# Patient Record
Sex: MALE | Race: WHITE | Employment: PART TIME | URBAN - METROPOLITAN AREA
[De-identification: names, ages, dates, MRNs, and addresses within clinical notes are randomized per-mention and may not be internally consistent; named-entity substitution may affect disease eponyms.]

---

## 2020-12-18 ENCOUNTER — HOSPITAL ENCOUNTER (EMERGENCY)
Age: 55
Discharge: HOME | End: 2020-12-18
Attending: EMERGENCY MEDICINE

## 2020-12-18 ENCOUNTER — APPOINTMENT (OUTPATIENT)
Dept: GENERAL RADIOLOGY | Age: 55
End: 2020-12-18

## 2020-12-18 VITALS
HEART RATE: 69 BPM | SYSTOLIC BLOOD PRESSURE: 158 MMHG | BODY MASS INDEX: 26.6 KG/M2 | DIASTOLIC BLOOD PRESSURE: 81 MMHG | TEMPERATURE: 97.9 F | OXYGEN SATURATION: 97 % | WEIGHT: 190 LBS | RESPIRATION RATE: 16 BRPM | HEIGHT: 71 IN

## 2020-12-18 PROCEDURE — 12002 RPR S/N/AX/GEN/TRNK2.6-7.5CM: CPT | Performed by: ORTHOPAEDIC SURGERY

## 2020-12-18 PROCEDURE — 96372 THER/PROPH/DIAG INJ SC/IM: CPT

## 2020-12-18 PROCEDURE — 99283 EMERGENCY DEPT VISIT LOW MDM: CPT | Performed by: ORTHOPAEDIC SURGERY

## 2020-12-18 PROCEDURE — 11010 DEBRIDE SKIN AT FX SITE: CPT | Performed by: ORTHOPAEDIC SURGERY

## 2020-12-18 PROCEDURE — 26725 TREAT FINGER FRACTURE EACH: CPT | Performed by: ORTHOPAEDIC SURGERY

## 2020-12-18 PROCEDURE — 90715 TDAP VACCINE 7 YRS/> IM: CPT | Performed by: STUDENT IN AN ORGANIZED HEALTH CARE EDUCATION/TRAINING PROGRAM

## 2020-12-18 PROCEDURE — 6360000002 HC RX W HCPCS: Performed by: STUDENT IN AN ORGANIZED HEALTH CARE EDUCATION/TRAINING PROGRAM

## 2020-12-18 PROCEDURE — 73130 X-RAY EXAM OF HAND: CPT

## 2020-12-18 PROCEDURE — 96375 TX/PRO/DX INJ NEW DRUG ADDON: CPT

## 2020-12-18 PROCEDURE — 96366 THER/PROPH/DIAG IV INF ADDON: CPT

## 2020-12-18 PROCEDURE — 99284 EMERGENCY DEPT VISIT MOD MDM: CPT

## 2020-12-18 PROCEDURE — 90471 IMMUNIZATION ADMIN: CPT | Performed by: STUDENT IN AN ORGANIZED HEALTH CARE EDUCATION/TRAINING PROGRAM

## 2020-12-18 PROCEDURE — 96365 THER/PROPH/DIAG IV INF INIT: CPT

## 2020-12-18 RX ORDER — MORPHINE SULFATE 4 MG/ML
4 INJECTION, SOLUTION INTRAMUSCULAR; INTRAVENOUS ONCE
Status: COMPLETED | OUTPATIENT
Start: 2020-12-18 | End: 2020-12-18

## 2020-12-18 RX ORDER — CEFAZOLIN SODIUM 2 G/50ML
2 SOLUTION INTRAVENOUS ONCE
Status: COMPLETED | OUTPATIENT
Start: 2020-12-18 | End: 2020-12-18

## 2020-12-18 RX ORDER — HYDROCODONE BITARTRATE AND ACETAMINOPHEN 5; 325 MG/1; MG/1
1 TABLET ORAL EVERY 6 HOURS PRN
Qty: 28 TABLET | Refills: 0 | Status: ON HOLD | OUTPATIENT
Start: 2020-12-18 | End: 2020-12-21 | Stop reason: HOSPADM

## 2020-12-18 RX ORDER — LIDOCAINE HYDROCHLORIDE 10 MG/ML
INJECTION, SOLUTION INFILTRATION; PERINEURAL
Status: DISCONTINUED
Start: 2020-12-18 | End: 2020-12-18 | Stop reason: HOSPADM

## 2020-12-18 RX ORDER — DOXYCYCLINE HYCLATE 100 MG
100 TABLET ORAL 2 TIMES DAILY
Qty: 14 TABLET | Refills: 0 | Status: SHIPPED | OUTPATIENT
Start: 2020-12-18 | End: 2020-12-25

## 2020-12-18 RX ORDER — CEPHALEXIN 500 MG/1
500 CAPSULE ORAL 4 TIMES DAILY
Qty: 28 CAPSULE | Refills: 0 | Status: SHIPPED | OUTPATIENT
Start: 2020-12-18 | End: 2020-12-25

## 2020-12-18 RX ADMIN — MORPHINE SULFATE 4 MG: 4 INJECTION, SOLUTION INTRAMUSCULAR; INTRAVENOUS at 16:11

## 2020-12-18 RX ADMIN — CEFAZOLIN SODIUM 2 G: 2 SOLUTION INTRAVENOUS at 17:07

## 2020-12-18 RX ADMIN — TETANUS TOXOID, REDUCED DIPHTHERIA TOXOID AND ACELLULAR PERTUSSIS VACCINE, ADSORBED 0.5 ML: 5; 2.5; 8; 8; 2.5 SUSPENSION INTRAMUSCULAR at 16:12

## 2020-12-18 ASSESSMENT — PAIN DESCRIPTION - ORIENTATION: ORIENTATION: RIGHT

## 2020-12-18 ASSESSMENT — ENCOUNTER SYMPTOMS
VOMITING: 0
RHINORRHEA: 0
ABDOMINAL PAIN: 0
NAUSEA: 0
SHORTNESS OF BREATH: 0
BACK PAIN: 0

## 2020-12-18 ASSESSMENT — PAIN SCALES - GENERAL
PAINLEVEL_OUTOF10: 4
PAINLEVEL_OUTOF10: 5

## 2020-12-18 ASSESSMENT — PAIN DESCRIPTION - LOCATION: LOCATION: HAND

## 2020-12-18 ASSESSMENT — PAIN DESCRIPTION - PAIN TYPE: TYPE: ACUTE PAIN

## 2020-12-18 NOTE — ED PROVIDER NOTES
Patient is a 60-year-old male with no significant past medical history presents to the emergency department with a right hand injury. Patient states approximately 2 hours prior to arrival he was using his push snowblower when he noticed an occlusion in the shoot. He attempted to clear the snow chute while the snowblower was turned on using his fist, patient states he was also wearing leather gloves. Patient sustained injury to middle finger and came to the emergency department. Patient tetanus is not up-to-date. Patient is not on anticoagulation. Patient without previous injury to that hand or finger. Patient denies paresthesias or numbness, does complain of pain. Pain is 8/10, nonradiating, no palliation, no provocation, constant. Review of Systems   Constitutional: Negative for chills and fever. HENT: Negative for congestion and rhinorrhea. Respiratory: Negative for shortness of breath. Cardiovascular: Negative for chest pain. Gastrointestinal: Negative for abdominal pain, nausea and vomiting. Genitourinary: Negative for flank pain, frequency and urgency. Musculoskeletal: Positive for arthralgias. Negative for back pain, myalgias and neck pain. Skin: Positive for wound. Negative for rash. Neurological: Negative for dizziness, syncope, weakness and headaches. Psychiatric/Behavioral: Negative for agitation. The patient is not nervous/anxious. Physical Exam  Vitals signs and nursing note reviewed. Constitutional:       General: He is in acute distress. Appearance: He is not ill-appearing or toxic-appearing. HENT:      Head: Normocephalic and atraumatic. Mouth/Throat:      Mouth: Mucous membranes are moist.      Pharynx: Oropharynx is clear. Eyes:      Extraocular Movements: Extraocular movements intact. Pupils: Pupils are equal, round, and reactive to light. Neck:      Musculoskeletal: Normal range of motion and neck supple.    Cardiovascular: Rate and Rhythm: Normal rate and regular rhythm. Pulses: Normal pulses. Heart sounds: Normal heart sounds. Pulmonary:      Effort: Pulmonary effort is normal.      Breath sounds: Normal breath sounds. Abdominal:      General: Bowel sounds are normal. There is no distension. Palpations: Abdomen is soft. Tenderness: There is no abdominal tenderness. Musculoskeletal:      Right hand: He exhibits decreased range of motion, tenderness, deformity and laceration. Normal sensation noted. Decreased strength noted. Skin:     General: Skin is warm and dry. Capillary Refill: Capillary refill takes less than 2 seconds. Neurological:      Mental Status: He is alert and oriented to person, place, and time. GCS: GCS eye subscore is 4. GCS verbal subscore is 5. GCS motor subscore is 6. Cranial Nerves: Cranial nerves are intact. Sensory: Sensation is intact. Motor: Motor function is intact. MDM  Number of Diagnoses or Management Options  Closed displaced fracture of middle phalanx of right middle finger, initial encounter  Mutilating hand injury, right, initial encounter  Diagnosis management comments: Patient is a 27-year-old male who presents to the emergency department for traumatic injury to the right hand after putting his hand in a running snowblower. Patient with deformity, lacerations to the right middle finger, right index finger. Patient presents awake, alert, following all commands, complaining of pain to the right hand. Vital signs show no abnormalities including no hypotension or tachycardia. Physical exam shows lacerations to the middle and index finger, with deformity. She was treated with analgesia. Physical exam shows deformity, pulse, motor, sensation are intact distal.  Bleeding is controlled. Strength and range of motion are decreased due to swelling and pain.   X-rays show fractures to the second and third phalanx of the middle finger as well as avulsion fracture. Orthopedic surgery was consulted and resident came to the emergency department. Treatment by resident including evaluation, repair, splinting. Patient was able to be discharged per orthopedic surgery to follow-up with their offices on Monday. Treatment and antibiotics as per orthopedic surgery. Patient was monitored in the emergency department that further change and he was able to be discharged in stable condition. Patient was given analgesia and antibiotics on discharge. Amount and/or Complexity of Data Reviewed  Clinical lab tests: ordered and reviewed  Tests in the radiology section of CPT®: ordered and reviewed                                     ED Course as of Dec 18 2130   Fri Dec 18, 2020   1530 ATTENDING PROVIDER ATTESTATION:     Sofia Corona presented to the emergency department for evaluation of [unfilled]  I have reviewed and discussed the case, including pertinent history (medical, surgical, family and social) and exam findings with the Midlevel and the Nurse assigned to Sofia Corona. I have personally performed and/or participated in the history, exam, medical decision making, and procedures and agree with all pertinent clinical information. I have reviewed my findings and recommendations with Sofia Corona and members of family present at the time of disposition. My findings/plan: Patient is a 64-year-old male who presents the chief complaint of right hand lacerations. Patient states that about 2 hours ago he had a leather glove on and made a fist and was punching snow into a snowblower that was on. He did sustain multiple lacerations of his right index and middle finger. Patient denies any numbness or tingling of the extremity. His tetanus is not up-to-date. Patient has been putting pressure on the wounds until he can find a ride to the emergency department. No blood clotting disorders and he is on no blood thinners.   He does admit 2 hours ago he had a leather glove on and made a fist and was punching snow into a snowblower that was on. He did sustain multiple lacerations of his right index and middle finger. Patient denies any numbness or tingling of the extremity. His tetanus is not up-to-date. Patient has been putting pressure on the wounds until he can find a ride to the emergency department. No blood clotting disorders and he is on no blood thinners. He does admit to pain of the fingers. On examination he is resting comfortably in bed. Clear breath sounds in all lung fields. Regular rate and rhythm of the heart. No abdominal tenderness palpation. No lower extremity edema. He is alert and oriented x3. Patient does have multiple lacerations of his right index and middle finger. Decreased range of motion secondary to pain. Bleeding is controlled. The lacerations are deep and concern for possible open fracture. Pictures are included in the note. Marcela Mayer DO      [MS]   9261 Consult Dr. Becka Servin and patient was discussed. Recommends Betadine soak of the right hand, and resident will come see the patient. Plan for possible admission.    [QC]   1708 Patient reevaluation. Patient has improved pain to hand with analgesia. [QC]   18 Contacted the orthopedic surgery resident, Dr. Vivian Hill. He is currently at [facility] doing another procedure and will be at our facility shortly. [MS]   Liyah resident is in the ED to examine the patient at this time. [MS]      ED Course User Index  [MS] Soraya Warren DO  [QC] Paula Leary MD       --------------------------------------------- PAST HISTORY ---------------------------------------------  Past Medical History:  has no past medical history on file. Past Surgical History:  has a past surgical history that includes Tonsillectomy. Social History:  reports that he has been smoking cigars.  He uses smokeless tobacco. He reports current alcohol use. He reports current drug use. Drug: Marijuana. Family History: family history is not on file. The patients home medications have been reviewed. Allergies: Patient has no known allergies. -------------------------------------------------- RESULTS -------------------------------------------------    Lab  No results found for this visit on 12/18/20. Radiology  XR HAND RIGHT (MIN 3 VIEWS)   Final Result   Soft tissue lacerations involving the 2nd and 3rd digits distally, along with   fractures at the distal aspect of the 3rd proximal phalanx and proximal   aspect of the 3rd middle phalanx. There also appears to be a small avulsion   fracture at the base of the 2nd distal phalanx. Several punctate   hyperdensities in the distal soft tissues of the 3rd digit are suggestive of   tiny radiopaque foreign bodies. XR HAND RIGHT (MIN 3 VIEWS)    (Results Pending)     ------------------------- NURSING NOTES AND VITALS REVIEWED ---------------------------  Date / Time Roomed:  12/18/2020  3:30 PM  ED Bed Assignment:  01/01    The nursing notes within the ED encounter and vital signs as below have been reviewed. Patient Vitals for the past 24 hrs:   BP Temp Temp src Pulse Resp SpO2 Height Weight   12/18/20 1841 (!) 158/81 -- -- 69 16 97 % -- --   12/18/20 1530 (!) 157/97 -- -- -- -- -- -- --   12/18/20 1524 -- 97.9 °F (36.6 °C) Temporal 85 18 97 % 5' 10.5\" (1.791 m) 190 lb (86.2 kg)     Oxygen Saturation Interpretation: Normal    ------------------------------------------ PROGRESS NOTES ------------------------------------------  Re-evaluation(s):   Patients symptoms are improving  Repeat physical examination is not changed    Patients symptoms are improving  Repeat physical examination is not changed    I have spoken with the patient and discussed todays results, in addition to providing specific details for the plan of care and counseling regarding the diagnosis and prognosis. Their questions are answered at this time and they are agreeable with the plan.      --------------------------------- ADDITIONAL PROVIDER NOTES ---------------------------------  Consultations:  Spoke with Dr. Ernesto Mujica,  They will provide consultation, will come to the ED to evaluate this patient, state that the patient is okay to discharge home and will see this patient in follow-up. This patient's ED course included: a personal history and physicial examination, re-evaluation prior to disposition and IV medications    This patient has remained hemodynamically stable and remained unchanged during their ED course. Please note that the withdrawal or failure to initiate urgent interventions for this patient would likely result in a life threatening deterioration or permanent disability. Clinical Impression  1. Closed displaced fracture of middle phalanx of right middle finger, initial encounter    2. Mutilating hand injury, right, initial encounter          Disposition  Patient's disposition: Discharge to home  Patient's condition is stable    12/18/20, 9:30 PM EST.     This note is prepared by Bety Albright MD -PGY-1         Bety Albright MD  Resident  12/19/20 1351

## 2020-12-18 NOTE — ED NOTES
Vitals reassessed, pt states not really having any pain, awaiting ortho     Renee Nice, HAY  12/18/20 9714

## 2020-12-19 NOTE — CONSULTS
Department of Orthopedic Surgery  Resident Consult Note      Reason for Consult: Right hand pain    HISTORY OF PRESENT ILLNESS:       Patient is a 48 y.o. male, right-hand-dominant, who presents with complaint of right hand pain. Earlier today he was using a snowblower and attempted to remove impacted snow from the machine with a closed fist in a glove. The auger in the snowblower became unclogged and injured his hand. He noted immediate pain to the hand as well as some bleeding. He presented to the emergency department where his injuries were evaluated by the emergency physician. He received 2 g of Ancef as well as tetanus. Orthopedic surgery was consulted for further evaluation and management of his injury. Patient's pain is fairly well controlled at time of examination. He admits to some numbness in the digits. His right second and third digit of the affected areas. He has had several distant injuries which required orthopedic intervention, however he is not currently established with any orthopedic provider. He has no further orthopedic complaints at this time. Past Medical History:    No past medical history on file. Past Surgical History:        Procedure Laterality Date    TONSILLECTOMY       Current Medications:   Current Facility-Administered Medications: lidocaine 1 % injection, , ,   Allergies:  Patient has no known allergies. Social History:   TOBACCO:   reports that he has been smoking cigars. He uses smokeless tobacco.  ETOH:   reports current alcohol use. DRUGS:   reports current drug use. Drug: Marijuana. ACTIVITIES OF DAILY LIVING:    OCCUPATION:    Family History:   No family history on file.     REVIEW OF SYSTEMS:  CONSTITUTIONAL:  negative for  fevers, chills  EYES:  negative for blurred vision, visual disturbance  HEENT:  negative for  hearing loss, voice change  RESPIRATORY:  negative for  dyspnea, wheezing  CARDIOVASCULAR:  negative for  chest pain, palpitations  GASTROINTESTINAL:  negative for nausea, vomiting  GENITOURINARY:  negative for frequency, urinary incontinence  HEMATOLOGIC/LYMPHATIC:  negative for bleeding and petechiae  MUSCULOSKELETAL:  See HPI  NEUROLOGICAL:  negative for headaches, dizziness  BEHAVIOR/PSYCH:  negative for increased agitation and anxiety    PHYSICAL EXAM:    VITALS:  BP (!) 158/81   Pulse 69   Temp 97.9 °F (36.6 °C) (Temporal)   Resp 16   Ht 5' 10.5\" (1.791 m)   Wt 190 lb (86.2 kg)   SpO2 97%   BMI 26.88 kg/m²   CONSTITUTIONAL:  awake, alert, cooperative, no apparent distress, and appears stated age  MUSCULOSKELETAL:  Right upper Extremity:  · Several areas of laceration noted to the right long and index finger. Index finger laceration over the dorsal surface just proximal to the nail plate and extends to the ulnar side of the digit. Lacerations noted over the dorsal aspect of the long finger. There are also lacerations noted over the volar side of the long finger at the level of the DIPJ. · There is also deformity noted about the middle phalanx of the long finger, the distal finger is angulated ulnar  · Sensation is intact distally in all fingertips of both the radial and ulnar side with the exception of the ulnar side of the long finger  · Motor function is intact in the AIN, PIN, radial, ulnar, and median nerve distributions. Specifically the patient is able to extend and flex at the PIP J and DIPJ of the index and long finger  · Patient is unable to make a composite fist secondary to swelling and pain  · Radial pulse +2/4  · No tenderness to palpation about the wrist, elbow, or shoulder  · Compartments soft and compressible    Secondary Exam:   · leftUE: No obvious signs of trauma. -TTP to fingers, hand, wrist, forearm, elbow, humerus, shoulder or clavicle. -- Patient able to flex/extend fingers, wrist, elbow and shoulder with active and passive ROM without pain, +2/4 Radial pulse, cap refill <3sec, +AIN/PIN/Radial/Ulnar/Median N, distal sensation grossly intact to C4-T1 dermatomes, compartments soft and compressible. · bilateralLE: No obvious signs of trauma. -TTP to foot, ankle, leg, knee, thigh, hip.-- Patient able to flex/extend toes, ankle, knee and hip with active and passive ROM without pain,+2/4 DP & PT pulses, cap refill <3sec, +5/5 PF/DF/EHL, distal sensation grossly intact to L4-S1 dermatomes, compartments soft and compressible. · Pelvis: -TTP, -Log roll, -Heel strike     DATA:    CBC: No results found for: WBC, RBC, HGB, HCT, MCV, MCH, MCHC, RDW, PLT, MPV  PT/INR:  No results found for: PROTIME, INR    Radiology Review:  X-ray imaging obtained of the right hand. An oblique fracture is noted over the proximal third to middle third of the middle phalanx of the long finger. The distal fragment is translated volarly and there is shortening. There is also a bone fragment which appears to be avulsed from the distal aspect of the proximal phalanx of the long finger. There is also a small bony fragment off the dorsal aspect of the distal part of the middle phalanx of the index finger. IMPRESSION:  · Closed fracture of the right long finger, middle phalanx    PLAN:  · Consent was obtained from the patient. The dorsal aspect of the hand was prepared with chlorhexidine. 20 cc of lidocaine without epinephrine was infiltrated about the radial and ulnar digital nerves to the long and index finger of the right hand. After appropriate anesthetic response was obtained, the hand was sterilely prepared with Betadine and was draped with sterile technique. The areas of laceration were copiously irrigated and explored. The laceration of the dorsal aspect of the index finger was first explored. The proximal nail plate on that digit was displaced dorsally over the eponychium. This was repositioned to the anatomic location. The nail plate was found to be well fixed both ulnarly and radially.   The

## 2020-12-21 ENCOUNTER — HOSPITAL ENCOUNTER (OUTPATIENT)
Age: 55
Setting detail: OUTPATIENT SURGERY
Discharge: HOME | End: 2020-12-21
Attending: ORTHOPAEDIC SURGERY | Admitting: ORTHOPAEDIC SURGERY

## 2020-12-21 ENCOUNTER — ANESTHESIA EVENT (OUTPATIENT)
Dept: OPERATING ROOM | Age: 55
End: 2020-12-21

## 2020-12-21 ENCOUNTER — ANESTHESIA (OUTPATIENT)
Dept: OPERATING ROOM | Age: 55
End: 2020-12-21

## 2020-12-21 ENCOUNTER — HOSPITAL ENCOUNTER (OUTPATIENT)
Dept: GENERAL RADIOLOGY | Age: 55
Discharge: HOME | End: 2020-12-23
Attending: ORTHOPAEDIC SURGERY

## 2020-12-21 VITALS
SYSTOLIC BLOOD PRESSURE: 140 MMHG | BODY MASS INDEX: 27.86 KG/M2 | HEART RATE: 65 BPM | TEMPERATURE: 97.1 F | DIASTOLIC BLOOD PRESSURE: 77 MMHG | OXYGEN SATURATION: 97 % | WEIGHT: 199 LBS | HEIGHT: 71 IN | RESPIRATION RATE: 16 BRPM

## 2020-12-21 VITALS
RESPIRATION RATE: 13 BRPM | OXYGEN SATURATION: 100 % | SYSTOLIC BLOOD PRESSURE: 132 MMHG | DIASTOLIC BLOOD PRESSURE: 90 MMHG

## 2020-12-21 LAB
AMPHETAMINE SCREEN, URINE: NOT DETECTED
BARBITURATE SCREEN URINE: NOT DETECTED
BENZODIAZEPINE SCREEN, URINE: NOT DETECTED
CANNABINOID SCREEN URINE: POSITIVE
COCAINE METABOLITE SCREEN URINE: NOT DETECTED
FENTANYL SCREEN, URINE: NOT DETECTED
Lab: ABNORMAL
METHADONE SCREEN, URINE: NOT DETECTED
OPIATE SCREEN URINE: NOT DETECTED
OXYCODONE URINE: NOT DETECTED
PHENCYCLIDINE SCREEN URINE: NOT DETECTED
SARS-COV-2, NAAT: NOT DETECTED

## 2020-12-21 PROCEDURE — U0002 COVID-19 LAB TEST NON-CDC: HCPCS

## 2020-12-21 PROCEDURE — 7100000010 HC PHASE II RECOVERY - FIRST 15 MIN: Performed by: ORTHOPAEDIC SURGERY

## 2020-12-21 PROCEDURE — 3209999900 FLUORO FOR SURGICAL PROCEDURES

## 2020-12-21 PROCEDURE — 26356 REPAIR FINGER/HAND TENDON: CPT | Performed by: ORTHOPAEDIC SURGERY

## 2020-12-21 PROCEDURE — 6360000002 HC RX W HCPCS: Performed by: ORTHOPAEDIC SURGERY

## 2020-12-21 PROCEDURE — 80307 DRUG TEST PRSMV CHEM ANLYZR: CPT

## 2020-12-21 PROCEDURE — 3700000001 HC ADD 15 MINUTES (ANESTHESIA): Performed by: ORTHOPAEDIC SURGERY

## 2020-12-21 PROCEDURE — 2580000003 HC RX 258: Performed by: NURSE ANESTHETIST, CERTIFIED REGISTERED

## 2020-12-21 PROCEDURE — 26735 TREAT FINGER FRACTURE EACH: CPT | Performed by: ORTHOPAEDIC SURGERY

## 2020-12-21 PROCEDURE — 2709999900 HC NON-CHARGEABLE SUPPLY: Performed by: ORTHOPAEDIC SURGERY

## 2020-12-21 PROCEDURE — 3700000000 HC ANESTHESIA ATTENDED CARE: Performed by: ORTHOPAEDIC SURGERY

## 2020-12-21 PROCEDURE — 64415 NJX AA&/STRD BRCH PLXS IMG: CPT | Performed by: ANESTHESIOLOGY

## 2020-12-21 PROCEDURE — 6360000002 HC RX W HCPCS

## 2020-12-21 PROCEDURE — 2580000003 HC RX 258: Performed by: ANESTHESIOLOGY

## 2020-12-21 PROCEDURE — 3600000014 HC SURGERY LEVEL 4 ADDTL 15MIN: Performed by: ORTHOPAEDIC SURGERY

## 2020-12-21 PROCEDURE — 7100000000 HC PACU RECOVERY - FIRST 15 MIN: Performed by: ORTHOPAEDIC SURGERY

## 2020-12-21 PROCEDURE — 3600000004 HC SURGERY LEVEL 4 BASE: Performed by: ORTHOPAEDIC SURGERY

## 2020-12-21 PROCEDURE — C1713 ANCHOR/SCREW BN/BN,TIS/BN: HCPCS | Performed by: ORTHOPAEDIC SURGERY

## 2020-12-21 PROCEDURE — 7100000011 HC PHASE II RECOVERY - ADDTL 15 MIN: Performed by: ORTHOPAEDIC SURGERY

## 2020-12-21 PROCEDURE — 7100000001 HC PACU RECOVERY - ADDTL 15 MIN: Performed by: ORTHOPAEDIC SURGERY

## 2020-12-21 DEVICE — SCREW BNE L9MM DIA1.5MM CORT S STL ST FULL THRD T4 STARDRV: Type: IMPLANTABLE DEVICE | Site: MIDDLE FINGER | Status: FUNCTIONAL

## 2020-12-21 DEVICE — SCREW BNE L7MM DIA1.5MM CORT S STL ST FULL THRD T4 STARDRV: Type: IMPLANTABLE DEVICE | Site: MIDDLE FINGER | Status: FUNCTIONAL

## 2020-12-21 DEVICE — SCREW BNE L8MM DIA1.5MM CORT S STL ST FULL THRD T4 STARDRV: Type: IMPLANTABLE DEVICE | Site: MIDDLE FINGER | Status: FUNCTIONAL

## 2020-12-21 DEVICE — PLATE BNE L40MM THK1MM 3X7 H HND 316L S STL T SHP VAR ANG: Type: IMPLANTABLE DEVICE | Site: MIDDLE FINGER | Status: FUNCTIONAL

## 2020-12-21 RX ORDER — ROPIVACAINE HYDROCHLORIDE 5 MG/ML
INJECTION, SOLUTION EPIDURAL; INFILTRATION; PERINEURAL
Status: COMPLETED
Start: 2020-12-21 | End: 2020-12-21

## 2020-12-21 RX ORDER — CEFAZOLIN SODIUM 2 G/50ML
2 SOLUTION INTRAVENOUS ONCE
Status: COMPLETED | OUTPATIENT
Start: 2020-12-21 | End: 2020-12-21

## 2020-12-21 RX ORDER — CEFAZOLIN SODIUM 2 G/50ML
SOLUTION INTRAVENOUS
Status: DISCONTINUED
Start: 2020-12-21 | End: 2020-12-21 | Stop reason: HOSPADM

## 2020-12-21 RX ORDER — SODIUM CHLORIDE 0.9 % (FLUSH) 0.9 %
10 SYRINGE (ML) INJECTION PRN
Status: DISCONTINUED | OUTPATIENT
Start: 2020-12-21 | End: 2020-12-21 | Stop reason: HOSPADM

## 2020-12-21 RX ORDER — MIDAZOLAM HYDROCHLORIDE 1 MG/ML
INJECTION INTRAMUSCULAR; INTRAVENOUS
Status: COMPLETED
Start: 2020-12-21 | End: 2020-12-21

## 2020-12-21 RX ORDER — ROPIVACAINE HYDROCHLORIDE 5 MG/ML
40 INJECTION, SOLUTION EPIDURAL; INFILTRATION; PERINEURAL
Status: COMPLETED | OUTPATIENT
Start: 2020-12-21 | End: 2020-12-21

## 2020-12-21 RX ORDER — MIDAZOLAM HYDROCHLORIDE 1 MG/ML
2 INJECTION INTRAMUSCULAR; INTRAVENOUS PRN
Status: COMPLETED | OUTPATIENT
Start: 2020-12-21 | End: 2020-12-21

## 2020-12-21 RX ORDER — FENTANYL CITRATE 50 UG/ML
100 INJECTION, SOLUTION INTRAMUSCULAR; INTRAVENOUS ONCE
Status: COMPLETED | OUTPATIENT
Start: 2020-12-21 | End: 2020-12-21

## 2020-12-21 RX ORDER — DOCUSATE SODIUM 100 MG/1
100 CAPSULE, LIQUID FILLED ORAL 2 TIMES DAILY PRN
Qty: 20 CAPSULE | Refills: 1 | Status: SHIPPED | OUTPATIENT
Start: 2020-12-21 | End: 2021-02-03 | Stop reason: ALTCHOICE

## 2020-12-21 RX ORDER — SODIUM CHLORIDE, SODIUM LACTATE, POTASSIUM CHLORIDE, CALCIUM CHLORIDE 600; 310; 30; 20 MG/100ML; MG/100ML; MG/100ML; MG/100ML
INJECTION, SOLUTION INTRAVENOUS CONTINUOUS PRN
Status: DISCONTINUED | OUTPATIENT
Start: 2020-12-21 | End: 2020-12-21 | Stop reason: SDUPTHER

## 2020-12-21 RX ORDER — OXYCODONE HYDROCHLORIDE AND ACETAMINOPHEN 5; 325 MG/1; MG/1
1 TABLET ORAL EVERY 6 HOURS PRN
Qty: 28 TABLET | Refills: 0 | Status: SHIPPED | OUTPATIENT
Start: 2020-12-21 | End: 2020-12-28

## 2020-12-21 RX ORDER — FENTANYL CITRATE 50 UG/ML
INJECTION, SOLUTION INTRAMUSCULAR; INTRAVENOUS
Status: COMPLETED
Start: 2020-12-21 | End: 2020-12-21

## 2020-12-21 RX ORDER — SODIUM CHLORIDE 0.9 % (FLUSH) 0.9 %
10 SYRINGE (ML) INJECTION EVERY 12 HOURS SCHEDULED
Status: DISCONTINUED | OUTPATIENT
Start: 2020-12-21 | End: 2020-12-21 | Stop reason: HOSPADM

## 2020-12-21 RX ORDER — MEPERIDINE HYDROCHLORIDE 25 MG/ML
12.5 INJECTION INTRAMUSCULAR; INTRAVENOUS; SUBCUTANEOUS EVERY 5 MIN PRN
Status: DISCONTINUED | OUTPATIENT
Start: 2020-12-21 | End: 2020-12-21 | Stop reason: HOSPADM

## 2020-12-21 RX ORDER — FENTANYL CITRATE 50 UG/ML
50 INJECTION, SOLUTION INTRAMUSCULAR; INTRAVENOUS EVERY 10 MIN PRN
Status: DISCONTINUED | OUTPATIENT
Start: 2020-12-21 | End: 2020-12-21 | Stop reason: HOSPADM

## 2020-12-21 RX ORDER — ONDANSETRON 4 MG/1
4 TABLET, FILM COATED ORAL EVERY 6 HOURS PRN
Qty: 20 TABLET | Refills: 1 | Status: SHIPPED | OUTPATIENT
Start: 2020-12-21 | End: 2021-02-03

## 2020-12-21 RX ORDER — SODIUM CHLORIDE, SODIUM LACTATE, POTASSIUM CHLORIDE, CALCIUM CHLORIDE 600; 310; 30; 20 MG/100ML; MG/100ML; MG/100ML; MG/100ML
INJECTION, SOLUTION INTRAVENOUS CONTINUOUS
Status: DISCONTINUED | OUTPATIENT
Start: 2020-12-21 | End: 2020-12-21 | Stop reason: HOSPADM

## 2020-12-21 RX ADMIN — FENTANYL CITRATE 100 MCG: 50 INJECTION, SOLUTION INTRAMUSCULAR; INTRAVENOUS at 10:32

## 2020-12-21 RX ADMIN — MIDAZOLAM 2 MG: 1 INJECTION INTRAMUSCULAR; INTRAVENOUS at 10:37

## 2020-12-21 RX ADMIN — MIDAZOLAM HYDROCHLORIDE 2 MG: 1 INJECTION INTRAMUSCULAR; INTRAVENOUS at 10:32

## 2020-12-21 RX ADMIN — MIDAZOLAM HYDROCHLORIDE 2 MG: 1 INJECTION INTRAMUSCULAR; INTRAVENOUS at 10:37

## 2020-12-21 RX ADMIN — ROPIVACAINE HYDROCHLORIDE 40 ML: 5 INJECTION, SOLUTION EPIDURAL; INFILTRATION; PERINEURAL at 10:40

## 2020-12-21 RX ADMIN — SODIUM CHLORIDE, POTASSIUM CHLORIDE, SODIUM LACTATE AND CALCIUM CHLORIDE: 600; 310; 30; 20 INJECTION, SOLUTION INTRAVENOUS at 09:14

## 2020-12-21 RX ADMIN — CEFAZOLIN SODIUM 2 G: 2 SOLUTION INTRAVENOUS at 11:40

## 2020-12-21 RX ADMIN — MIDAZOLAM 2 MG: 1 INJECTION INTRAMUSCULAR; INTRAVENOUS at 10:32

## 2020-12-21 RX ADMIN — SODIUM CHLORIDE, POTASSIUM CHLORIDE, SODIUM LACTATE AND CALCIUM CHLORIDE: 600; 310; 30; 20 INJECTION, SOLUTION INTRAVENOUS at 11:25

## 2020-12-21 RX ADMIN — FENTANYL CITRATE 100 MCG: 50 INJECTION INTRAMUSCULAR; INTRAVENOUS at 10:32

## 2020-12-21 RX ADMIN — SODIUM CHLORIDE, POTASSIUM CHLORIDE, SODIUM LACTATE AND CALCIUM CHLORIDE: 600; 310; 30; 20 INJECTION, SOLUTION INTRAVENOUS at 12:43

## 2020-12-21 ASSESSMENT — PULMONARY FUNCTION TESTS
PIF_VALUE: 0
PIF_VALUE: 1
PIF_VALUE: 0
PIF_VALUE: 2
PIF_VALUE: 0
PIF_VALUE: 1
PIF_VALUE: 2
PIF_VALUE: 1
PIF_VALUE: 0
PIF_VALUE: 1
PIF_VALUE: 0
PIF_VALUE: 1
PIF_VALUE: 0
PIF_VALUE: 0
PIF_VALUE: 1
PIF_VALUE: 0
PIF_VALUE: 3
PIF_VALUE: 2
PIF_VALUE: 0
PIF_VALUE: 1
PIF_VALUE: 1
PIF_VALUE: 0
PIF_VALUE: 2
PIF_VALUE: 1
PIF_VALUE: 0
PIF_VALUE: 1
PIF_VALUE: 0
PIF_VALUE: 2
PIF_VALUE: 0
PIF_VALUE: 1
PIF_VALUE: 3
PIF_VALUE: 0
PIF_VALUE: 1
PIF_VALUE: 0
PIF_VALUE: 1
PIF_VALUE: 0
PIF_VALUE: 1
PIF_VALUE: 0
PIF_VALUE: 1
PIF_VALUE: 2
PIF_VALUE: 1
PIF_VALUE: 3
PIF_VALUE: 0
PIF_VALUE: 1
PIF_VALUE: 0
PIF_VALUE: 1
PIF_VALUE: 1
PIF_VALUE: 2
PIF_VALUE: 0
PIF_VALUE: 3
PIF_VALUE: 0
PIF_VALUE: 1
PIF_VALUE: 0
PIF_VALUE: 1
PIF_VALUE: 0
PIF_VALUE: 1
PIF_VALUE: 0
PIF_VALUE: 1
PIF_VALUE: 0
PIF_VALUE: 1
PIF_VALUE: 0
PIF_VALUE: 1

## 2020-12-21 ASSESSMENT — PAIN SCALES - GENERAL
PAINLEVEL_OUTOF10: 0

## 2020-12-21 ASSESSMENT — PAIN - FUNCTIONAL ASSESSMENT: PAIN_FUNCTIONAL_ASSESSMENT: 0-10

## 2020-12-21 NOTE — ANESTHESIA POSTPROCEDURE EVALUATION
Department of Anesthesiology  Postprocedure Note    Patient: Michael Mai  MRN: [id number]  YOB: 1965  Date of evaluation: 12/21/2020  Time:  1:50 PM     Procedure Summary     Date: 12/21/20 Room / Location: [address] [place] [address]    Anesthesia Start: 8789 Anesthesia Stop: 7262    Procedure: RIGHT MIDDLE FINGER OPEN REDUCTION INTERNAL FIXATION (Right Fingers) Diagnosis: (FRACTURE)    Surgeons: Walker Escobedo DO Responsible Provider: Cozetta Schilder, MD    Anesthesia Type: general ASA Status: 2          Anesthesia Type: general    Sam Phase I: Sam Score: 10    Sam Phase II:      Last vitals: Reviewed and per EMR flowsheets.        Anesthesia Post Evaluation    Patient location during evaluation: PACU  Patient participation: complete - patient participated  Level of consciousness: awake  Pain score: 0  Airway patency: patent  Nausea & Vomiting: no nausea  Complications: no  Cardiovascular status: blood pressure returned to baseline  Respiratory status: acceptable  Hydration status: euvolemic

## 2020-12-21 NOTE — H&P
Department of Orthopedic Surgery  H&P Note          CHIEF COMPLAINT:  Right Hand Injury    HISTORY OF PRESENT ILLNESS:                The patient is a 48 y.o. male who injured his right hand while cleaning out a snowblower on 12/18/2020. Patient states that the snowblower was clogged with snow. He thought the machine was off when he used his fist to clear the blockage. Unfortunately the snowblower was on and patient suffered multiple lacerations, soft tissue injury as well as bony injuries to his right hand. Patient was initially evaluated emergency department where he was treated with bedside irrigation debridement and primary closure. Patient did have some weakness with full flexion of his right long finger as well suffered bony injuries to his long finger. Patient was found to have a transverse fracture of the proximal phalanx of the right long finger as well as a condyle fracture of the proximal phalanx of the right long finger and possible tendon injury. Past Medical History:    History reviewed. No pertinent past medical history. Past Surgical History:        Procedure Laterality Date    TONSILLECTOMY       Current Medications:   No current facility-administered medications for this encounter. Allergies:  Patient has no known allergies. Social History:   TOBACCO:   reports that he has been smoking cigars. He has smoked for the past 1.00 year. He uses smokeless tobacco.  ETOH:   reports current alcohol use. DRUGS:   reports current drug use. Drug: Marijuana. ACTIVITIES OF DAILY LIVING:    OCCUPATION:    Family History:   History reviewed. No pertinent family history.     REVIEW OF SYSTEMS:  CONSTITUTIONAL:  negative  GASTROINTESTINAL:  negative  GENITOURINARY:  negative  MUSCULOSKELETAL:  positive for  decreased range of motion  NEUROLOGICAL:  negative  BEHAVIOR/PSYCH:  negative    PHYSICAL EXAM:    VITALS:  BP (!) 140/77   Pulse 65   Temp 97.1 °F (36.2 °C) (Temporal)   Resp 16   Ht 5' 10.5\" (1.791 m)   Wt 199 lb (90.3 kg)   SpO2 97%   BMI 28.15 kg/m²   CONSTITUTIONAL:  awake, alert, cooperative, no apparent distress, and appears stated age  EYES:  Lids and lashes normal, pupils equal, round and reactive to light, extra ocular muscles intact, sclera clear, conjunctiva normal  ENT:  Normocephalic, without obvious abnormality, atraumatic, sinuses nontender on palpation, external ears without lesions, oral pharynx with moist mucus membranes, tonsils without erythema or exudates, gums normal and good dentition. NECK:  Supple, symmetrical, trachea midline, no adenopathy, thyroid symmetric, not enlarged and no tenderness, skin normal  LUNGS:  No increased work of breathing, good air exchange, clear to auscultation bilaterally, no crackles or wheezing  CARDIOVASCULAR:  Normal apical impulse, regular rate and rhythm, normal S1 and S2, no S3 or S4, and no murmur noted  ABDOMEN:  No scars, normal bowel sounds, soft, non-distended, non-tender, no masses palpated, no hepatosplenomegally  GENITAL/URINARY:  deferred  NEUROLOGIC:  Awake, alert, oriented to name, place and time. Cranial nerves II-XII are grossly intact. Motor is 5 out of 5 bilaterally. Cerebellar finger to nose, heel to shin intact. Sensory is intact. Babinski down going, Romberg negative, and gait is normal.  MUSCULOSKELETAL:  · Right hand multiple lacerations noted to fingers with approximation with nylon suture. Patient has pain at fracture sites of the right hand as well as deformity noted. Sensation is intact to light touch. Flexor tendons are intact with exception of the right long finger which may be due to pain versus laceration. DATA:    CBC: No results found for: WBC, RBC, HGB, HCT, MCV, MCH, MCHC, RDW, PLT, MPV  PT/INR:  No results found for: PROTIME, INR  Radiology Review:  Xr Hand Right (min 3 Views)    Result Date: 12/18/2020  EXAMINATION: THREE XRAY VIEWS OF THE RIGHT HAND 12/18/2020 8:21 pm COMPARISON: 3:32 p.m. HISTORY: ORDERING SYSTEM PROVIDED HISTORY: post splint films TECHNOLOGIST PROVIDED HISTORY: Reason for exam:->post splint films FINDINGS: The fine osseous detail is obscured by the overlapping splint. Stable displaced non intra-articular fracture proximal middle phalanx 3rd digit. The fracture involving the distal proximal phalanx 3rd digit is not appreciated on the current exam.  The remainder of the exam is unremarkable. Stable displaced non intra-articular fracture proximal middle phalanx 3rd digit. The fracture involving the distal proximal phalanx of the 3rd digit is not appreciated on the current exam.    Xr Hand Right (min 3 Views)    Result Date: 12/18/2020  EXAMINATION: THREE XRAY VIEWS OF THE RIGHT HAND 12/18/2020 3:41 pm COMPARISON: None. HISTORY: ORDERING SYSTEM PROVIDED HISTORY: Possible open fracture; hand in snowplow TECHNOLOGIST PROVIDED HISTORY: Reason for exam:->possible open fracture; hand in snowblower FINDINGS: There is a transversely oriented fracture through the proximal aspect of the 3rd middle phalanx. In addition, an obliquely oriented fracture involves the distal aspect of the 3rd proximal phalanx with intra-articular extension into the 3rd PIP joint. A tiny ossific structure adjacent to the base of the 2nd distal phalanx is suggestive of a small avulsion injury. Soft tissue lacerations of the 2nd and 3rd digits are noted. Several punctate hyperdensities in the soft tissues of the distal 3rd digit are present, potentially reflecting small radiopaque foreign bodies. Advanced degenerative changes of the 1st interphalangeal joint are present. Soft tissue lacerations involving the 2nd and 3rd digits distally, along with fractures at the distal aspect of the 3rd proximal phalanx and proximal aspect of the 3rd middle phalanx. There also appears to be a small avulsion fracture at the base of the 2nd distal phalanx.   Several punctate hyperdensities in the distal soft tissues of the

## 2020-12-21 NOTE — ANESTHESIA PROCEDURE NOTES
Peripheral Block    Patient location during procedure: PACU  Start time: 12/21/2020 10:36 AM  End time: 12/21/2020 10:41 AM  Staffing  Anesthesiologist: Fatimah Victoria MD  Preanesthetic Checklist  Completed: patient identified, IV checked, site marked, risks and benefits discussed, surgical consent, monitors and equipment checked, pre-op evaluation, timeout performed, anesthesia consent given, oxygen available and patient being monitored  Peripheral Block  Patient position: supine  Block type: Brachial plexus  Laterality: right  Injection technique: single-shot  Guidance: nerve stimulator  Local infiltration: ropivacaine  Infiltration strength: 0.5 %  Dose: 40 mL  Infraclavicular  Local infiltration: ropivacaine  Needle  Needle type: combined needle/nerve stimulator   Needle gauge: 22 G  Needle length: 5 cm  Needle localization: anatomical landmarks and nerve stimulator  Needle insertion depth: 5 cm  Assessment  Injection assessment: negative aspiration for heme and no paresthesia on injection  Paresthesia pain: none  Slow fractionated injection: yes  Hemodynamics: stable  Additional Notes  Versed 2 + 2 mg IV, Fentanyl 100 micrograms. Right upper chest, then it is prepared with Chloraseptic. Rt Coracoid process Identified, then, measure 1 inch medial then 1 inch caudal. At that point , the nerve block needle is inserted perpendicular to the body plane. twitches are got as low as .30, in the right fingers. Then, I injected 40 cc Rop .5 %. No complications.     Reason for block: post-op pain management

## 2020-12-21 NOTE — PROGRESS NOTES
12/21/20 1430 reviewed discharge instructions with pt and his daughter.  Both verbalized understanding, signed in agreement and given copy. mare parnell

## 2020-12-21 NOTE — H&P
I have reviewed the history and physical and examined the patient and find no relevant changes. I have reviewed with the patient and/or family the risks, benefits, and alternatives to the procedure. The patient was counseled at length about the risks of masoud Covid-19 during their perioperative period and any recovery window from their procedure. The patient was made aware that masoud Covid-19  may worsen their prognosis for recovering from their procedure  and lend to a higher morbidity and/or mortality risk. All material risks, benefits, and reasonable alternatives including postponing the procedure were discussed. The patient does wish to proceed with the procedure at this time.         Presley Lee,   12/21/2020

## 2020-12-21 NOTE — ANESTHESIA PRE PROCEDURE
Department of Anesthesiology  Preprocedure Note       Name:  Casi Wilson   Age:  48 y.o.  :  1965                                          MRN:  [id number]         Date:  2020      Surgeon: Conrad Vicente): Guillermo Abreu DO    Procedure: Procedure(s):  RIGHT MIDDLE FINGER OPEN REDUCTION INTERNAL FIXATION    Medications prior to admission:   Prior to Admission medications    Medication Sig Start Date End Date Taking? Authorizing Provider   doxycycline hyclate (VIBRA-TABS) 100 MG tablet Take 1 tablet by mouth 2 times daily for 7 days 20  Clara Hadley DO   cephALEXin [facility]) 500 MG capsule Take 1 capsule by mouth 4 times daily for 7 days 20  Clara Hadley DO   HYDROcodone-acetaminophen (NORCO) 5-325 MG per tablet Take 1 tablet by mouth every 6 hours as needed for Pain for up to 7 days. Intended supply: 7 days. Take lowest dose possible to manage pain 20  Clara Hadley DO       Current medications:    Current Facility-Administered Medications   Medication Dose Route Frequency Provider Last Rate Last Admin    sodium chloride flush 0.9 % injection 10 mL  10 mL Intravenous 2 times per day Shira Clemons MD        sodium chloride flush 0.9 % injection 10 mL  10 mL Intravenous PRN Shira Clemons MD        lactated ringers infusion   Intravenous Continuous Shira Clemons MD           Allergies:  No Known Allergies    Problem List:  There is no problem list on file for this patient. Past Medical History:  No past medical history on file. Past Surgical History:        Procedure Laterality Date    TONSILLECTOMY         Social History:    Social History     Tobacco Use    Smoking status: Current Every Day Smoker     Types: Cigars    Smokeless tobacco: Current User   Substance Use Topics    Alcohol use: Yes     Comment:  Occ                                Ready to quit: Not Answered  Counseling given: Not Answered Vital Signs (Current): There were no vitals filed for this visit. BP Readings from Last 3 Encounters:   12/18/20 (!) 158/81       NPO Status:                                                                                 BMI:   Wt Readings from Last 3 Encounters:   12/18/20 190 lb (86.2 kg)     There is no height or weight on file to calculate BMI.    CBC: No results found for: WBC, RBC, HGB, HCT, MCV, RDW, PLT    CMP: No results found for: NA, K, CL, CO2, BUN, CREATININE, GFRAA, AGRATIO, LABGLOM, GLUCOSE, PROT, CALCIUM, BILITOT, ALKPHOS, AST, ALT    POC Tests: No results for input(s): POCGLU, POCNA, POCK, POCCL, POCBUN, POCHEMO, POCHCT in the last 72 hours. Coags: No results found for: PROTIME, INR, APTT    HCG (If Applicable): No results found for: PREGTESTUR, PREGSERUM, HCG, HCGQUANT     ABGs: No results found for: PHART, PO2ART, XPZ7GYJ, NVO5PBM, BEART, [id number]     Type & Screen (If Applicable):  No results found for: LABABO, LABRH    Drug/Infectious Status (If Applicable):  No results found for: HIV, HEPCAB    COVID-19 Screening (If Applicable):   Lab Results   Component Value Date    COVID19 Not Detected 12/21/2020         Anesthesia Evaluation  Patient summary reviewed  Airway:         Dental:          Pulmonary:Negative Pulmonary ROS                              Cardiovascular:Negative CV ROS                      Neuro/Psych:   Negative Neuro/Psych ROS              GI/Hepatic/Renal: Neg GI/Hepatic/Renal ROS            Endo/Other: Negative Endo/Other ROS                    Abdominal:           Vascular: negative vascular ROS.                                        Anesthesia Plan      MAC and other     ASA 1     (Patient had Coffee with cream and sugar at 7:00 AM, so we can not give hime GA ETT before 14:00 - 15:00, So, after talking to the surgeon, we will give him: Single shot Pre Operative Right Infra Clavicular Brachial Plexus Nerve Block.  ) Induction: intravenous. BIS  MIPS: Postoperative opioids intended. Anesthetic plan and risks discussed with patient. Plan discussed with CRNA.     Attending anesthesiologist reviewed and agrees with Winston Zimmer MD   12/21/2020

## 2020-12-21 NOTE — ANESTHESIA PRE PROCEDURE
Department of Anesthesiology  Preprocedure Note       Name:  Tasha Jackson   Age:  48 y.o.  :  1965                                          MRN:  [id number]         Date:  2020      Surgeon: Mary Hwang): Linnea Augustine DO    Procedure: Procedure(s):  RIGHT MIDDLE FINGER OPEN REDUCTION INTERNAL FIXATION    Medications prior to admission:   Prior to Admission medications    Medication Sig Start Date End Date Taking? Authorizing Provider   doxycycline hyclate (VIBRA-TABS) 100 MG tablet Take 1 tablet by mouth 2 times daily for 7 days 20 Yes Matthew Castaneda DO   cephALEXin (KEFLEX) 500 MG capsule Take 1 capsule by mouth 4 times daily for 7 days 20 Yes Matthew Castaneda DO   HYDROcodone-acetaminophen (NORCO) 5-325 MG per tablet Take 1 tablet by mouth every 6 hours as needed for Pain for up to 7 days. Intended supply: 7 days. Take lowest dose possible to manage pain 20  Matthew Castaneda DO       Current medications:    Current Facility-Administered Medications   Medication Dose Route Frequency Provider Last Rate Last Admin    sodium chloride flush 0.9 % injection 10 mL  10 mL Intravenous 2 times per day Aileen Rob MD        sodium chloride flush 0.9 % injection 10 mL  10 mL Intravenous PRN Aileen Rob MD        lactated ringers infusion   Intravenous Continuous Aileen Rob MD        meperidine (DEMEROL) injection 12.5 mg  12.5 mg Intravenous Q5 Min PRN Fatimah Victoria MD        HYDROmorphone (DILAUDID) injection 0.5 mg  0.5 mg Intravenous Q5 Min PRN Fatimah Victoria MD           Allergies:  No Known Allergies    Problem List:  There is no problem list on file for this patient. Past Medical History:  History reviewed. No pertinent past medical history.     Past Surgical History:        Procedure Laterality Date    TONSILLECTOMY         Social History:    Social History     Tobacco Use    Smoking status: Current Every Day Smoker Years: 1.00     Types: Cigars    Smokeless tobacco: Current User   Substance Use Topics    Alcohol use: Yes     Comment: Occ                                Ready to quit: Not Answered  Counseling given: Not Answered      Vital Signs (Current): There were no vitals filed for this visit. BP Readings from Last 3 Encounters:   12/18/20 (!) 158/81       NPO Status: Time of last liquid consumption: 0700(coffee with cream and sugar)                        Time of last solid consumption: 2230                        Date of last liquid consumption: 12/21/20                        Date of last solid food consumption: 12/20/20    BMI:   Wt Readings from Last 3 Encounters:   12/18/20 190 lb (86.2 kg)     There is no height or weight on file to calculate BMI.    CBC: No results found for: WBC, RBC, HGB, HCT, MCV, RDW, PLT    CMP: No results found for: NA, K, CL, CO2, BUN, CREATININE, GFRAA, AGRATIO, LABGLOM, GLUCOSE, PROT, CALCIUM, BILITOT, ALKPHOS, AST, ALT    POC Tests: No results for input(s): POCGLU, POCNA, POCK, POCCL, POCBUN, POCHEMO, POCHCT in the last 72 hours. Coags: No results found for: PROTIME, INR, APTT    HCG (If Applicable): No results found for: PREGTESTUR, PREGSERUM, HCG, HCGQUANT     ABGs: No results found for: PHART, PO2ART, OZF5RAL, KZK2WWE, BEART, [id number]     Type & Screen (If Applicable):  No results found for: LABABO, LABRH    Drug/Infectious Status (If Applicable):  No results found for: HIV, HEPCAB    COVID-19 Screening (If Applicable):   Lab Results   Component Value Date    COVID19 Not Detected 12/21/2020         Anesthesia Evaluation  Patient summary reviewed  Airway: Mallampati: III  TM distance: >3 FB   Neck ROM: full  Mouth opening: > = 3 FB Dental:          Pulmonary:normal exam  breath sounds clear to auscultation                            ROS comment: Smokes Marijuana.    Cardiovascular:Negative CV ROS            Rhythm: regular  Rate: normal Neuro/Psych:   Negative Neuro/Psych ROS              GI/Hepatic/Renal: Neg GI/Hepatic/Renal ROS            Endo/Other: Negative Endo/Other ROS                    Abdominal:           Vascular: negative vascular ROS. Anesthesia Plan      general     ASA 2     (He drank coffee with sugar and cream at 7:00 AM, we ca not do him before 14:00.)  Induction: intravenous. BIS  MIPS: Postoperative opioids intended. Anesthetic plan and risks discussed with patient. Plan discussed with CRNA.     Attending anesthesiologist reviewed and agrees with Hellen Umana MD   12/21/2020

## 2020-12-22 NOTE — OP NOTE
SURGEON: Cole Alberto DO    ASSISTANT:   Addis Jackson DO; Marielle Erwin DO; DO Shameka    PREOPERATIVE DIAGNOSIS:   1. Right long finger snowblower injury  2. Right long finger middle phalanx fracture  3. Right long finger proximal phalanx condyle fracture    POSTOPERATIVE DIAGNOSIS:    1. Right long finger middle phalanx fracture  2. Right long finger proximal phalanx condyle fracture  3. Right long finger flexor tendon laceration zone 2    PROCEDURE:   1. Open treatment of the phalangeal shaft fracture middle phalanx of right long finger with internal fixation  2. Closed treatment of phalangeal shaft fracture proximal phalanx of right long finger with manipulation  3. Primary repair of flexor tendon in zone 2 of right long finger    ANESTHESIA:   Monitored anesthesia and regional    ESTIMATED BLOOD LOSS:   15 ml    COMPLICATIONS: None    INDICATIONS:   Patient is a 60-year-old right-hand-dominant male who presented days earlier with a snowblower injury to the right hand. Patient tried to clear the snow from the machine when the machine turned back on lacerating his hand. Patient sustained soft tissue as well as bony injury and was initially seen in emergency department. He was provided bedside I&D with closure of the lacerations along with closed treatment reduction of his fractures. Due to the severity of his soft tissue injury, patient is recommended to have a second look irrigation debridement as well as to look for possible tendon laceration along with fracture fixation. Patient was educated about risks benefits of treatment options including but not limited to neurovascular injury, stiffness, infection, necrosis, smoking and risks associated with surgical complications, continued deformity and dysfunction as well as secondary surgeries including amputations as well as risk of anesthesia and even death. Patient verbalized understanding wishes proceed with surgery.       OPERATIVE PROCEDURE:   Patient was identified in the preoperative area. Correct extremity marked and consent signed. Questions were asked with risks and benefits discussed. Patient requests to proceed with surgery. The patient was brought to the operative suite and was transferred to the operative table with all bony prominences padded. Patient had already been taken oral antibiotics post a provided IV 2 g Ancef. A tourniquet was placed high in the proximal arm. After the extremity was sterilized prepped draped in the usual manner and official timeout was called. Procedure began with evaluation of the soft tissue injury to the whole hand. The approximating sutures were then released and the dorsal incision on the right on finger was extended from this laceration. Full-thickness flaps were maintained with identification and gentle retraction of neurovascular bundles. Incision was taken down to the middle phalanx fracture. This was mostly transverse close to the base of the bone itself. Patient was provided copious irrigation debridement at the site. Using fluoroscopy, gentle manipulation was applied at the fracture site. Once fragments were keyed in, reduction was maintained provisionally with K wire spanning the fracture. At this point a Synthes Tmini frag plate was sized and cut. This was placed dorsally on the middle phalanx spanning the fracture and fixated with cortical and locking screws. Fluoroscopy confirmed placement of plate and screws making sure to avoid any soft tissue irritation. Once this fracture was reduced and plated, attention was then turned toward to the condyle fracture of the proximal phalanx. This was an unusual fracture since it did not span intercondylar and was not articular. Therefore it was decided that this would be treated with closed treatment with manipulation with splinting. Attention was then turned toward the volar side which also involved laceration at the time of injury.   After all of the friable tissue was irrigated out, the flexor tendon in zone 2 was found to be partially lacerated along the FPS close to 50%. This most likely would lead to attritional rupture at some point in the future therefore the laceration of the flexor tendon in zone 2 at the level of the proximal and middle phalanx was repaired with 4 -O fiber loop and locking tension fashion. Once this was was completed, there was noted good excursion of the tendon itself. The extensor prado was then repaired with the same fiber loop in running fashion closing over the plate. The wounds were then copiously irrigated on low-flow normal saline with closure of the incisions with 3-0 nylon suture. The hand was then cleaned and dressed with bacitracin, Xeroform, gauze, cotton dressing and a clamdigger well-padded molded splint with manipulation applied to the fracture of the proximal phalanx condyle fracture. Patient then was awoken from anesthesia and transferred to the hospital bed and transferred to the PACU having tolerated the procedure well. Disposition:  Patient will be discharged with pain medication, antinausea, anticonstipation. Patient is to maintain splint until seen in clinic. Patient will continue with oral antibiotics and maintain strict elevation on 2 pillows and ice as needed.

## 2020-12-31 NOTE — CONSULTS
voice change  RESPIRATORY:  negative for  dyspnea, wheezing  CARDIOVASCULAR:  negative for  chest pain, palpitations  GASTROINTESTINAL:  negative for nausea, vomiting  GENITOURINARY:  negative for frequency, urinary incontinence  HEMATOLOGIC/LYMPHATIC:  negative for bleeding and petechiae  MUSCULOSKELETAL:  See HPI  NEUROLOGICAL:  negative for headaches, dizziness  BEHAVIOR/PSYCH:  negative for increased agitation and anxiety     PHYSICAL EXAM:    VITALS:  BP (!) 158/81   Pulse 69   Temp 97.9 °F (36.6 °C) (Temporal)   Resp 16   Ht 5' 10.5\" (1.791 m)   Wt 190 lb (86.2 kg)   SpO2 97%   BMI 26.88 kg/m²   CONSTITUTIONAL:  awake, alert, cooperative, no apparent distress, and appears stated age    HEENT: Head is normocephalic, atraumatic. PERRLA.      SKIN: Clean, dry, intact. There is not any cellulitis or cutaneous lesions noted in the lower extremities     PULMONARY: breathing is regular and unlabored, no acute distress     CV: The bilateral lower extremities are warm and well-perfused with brisk capillary refill. 2+ pulses LE bilateral.      PSYCHIATRY: Pleasant mood, appropriate behavior, follows commands     NEURO: Sensation is intact distally with light touch with no alteration. Motor exam of the lower extremities show quadriceps, hamstrings, foot dorsiflexion and plantarflexion grossly intact 5/5. MUSCULOSKELETAL:  Right upper Extremity:  Several areas of laceration noted to the right long and index finger. Index finger laceration over the dorsal surface just proximal to the nail plate and extends to the ulnar side of the digit measuring approximately 2 cm. 4 cm Laceration noted over the dorsal aspect of the long finger. There are also lacerations noted over the volar side of the long finger at the level of the DIPJ.   There is also deformity noted about the middle phalanx of the long finger, the distal finger is angulated ulnar  Sensation is intact distally in all fingertips of both the radial and ulnar side with the exception of the ulnar side of the long finger  Motor function is intact in the AIN, PIN, radial, ulnar, and median nerve distributions. Specifically the patient is able to extend and flex at the PIP J and DIPJ of the index and long finger  Patient is unable to make a composite fist secondary to swelling and pain  Radial pulse +2/4  No tenderness to palpation about the wrist, elbow, or shoulder  Compartments soft and compressible     Secondary Exam:   leftUE: No obvious signs of trauma. -TTP to fingers, hand, wrist, forearm, elbow, humerus, shoulder or clavicle. -- Patient able to flex/extend fingers, wrist, elbow and shoulder with active and passive ROM without pain, +2/4 Radial pulse, cap refill <3sec, +AIN/PIN/Radial/Ulnar/Median N, distal sensation grossly intact to C4-T1 dermatomes, compartments soft and compressible. bilateralLE: No obvious signs of trauma. -TTP to foot, ankle, leg, knee, thigh, hip.-- Patient able to flex/extend toes, ankle, knee and hip with active and passive ROM without pain,+2/4 DP & PT pulses, cap refill <3sec, +5/5 PF/DF/EHL, distal sensation grossly intact to L4-S1 dermatomes, compartments soft and compressible. Pelvis: -TTP, -Log roll, -Heel strike      DATA:    CBC: No results found for: WBC, RBC, HGB, HCT, MCV, MCH, MCHC, RDW, PLT, MPV  PT/INR:  No results found for: PROTIME, INR     Radiology Review:  X-ray imaging obtained of the right hand. An oblique fracture is noted over the proximal third to middle third of the middle phalanx of the long finger. The distal fragment is translated volarly and there is shortening. There is also a bone fragment which appears to be avulsed from the distal aspect of the proximal phalanx of the long finger. There is also a small bony fragment off the dorsal aspect of the distal part of the middle phalanx of the index finger.      IMPRESSION:  Open fracture of the right long finger, middle phalanx  Open fracture of right hand   Ice and elevate the extremity for control pain and swelling  PO pain medications  Keflex and doxycycline for 7 days

## 2021-01-05 DIAGNOSIS — M79.641 RIGHT HAND PAIN: Primary | ICD-10-CM

## 2021-01-06 ENCOUNTER — OFFICE VISIT (OUTPATIENT)
Dept: ORTHOPEDIC SURGERY | Age: 56
End: 2021-01-06

## 2021-01-06 VITALS — WEIGHT: 199 LBS | HEIGHT: 71 IN | TEMPERATURE: 98.6 F | BODY MASS INDEX: 27.86 KG/M2

## 2021-01-06 DIAGNOSIS — S62.629B OPEN DISPLACED FRACTURE OF MIDDLE PHALANX OF FINGER OF RIGHT HAND: Primary | ICD-10-CM

## 2021-01-06 PROCEDURE — 99024 POSTOP FOLLOW-UP VISIT: CPT | Performed by: ORTHOPAEDIC SURGERY

## 2021-01-06 RX ORDER — OXYCODONE HYDROCHLORIDE AND ACETAMINOPHEN 5; 325 MG/1; MG/1
1 TABLET ORAL EVERY 6 HOURS PRN
Qty: 28 TABLET | Refills: 0 | Status: SHIPPED | OUTPATIENT
Start: 2021-01-06 | End: 2021-01-13

## 2021-01-06 NOTE — PROGRESS NOTES
Chief Complaint   Patient presents with    Hand Injury     Right middle finger ORIF DOS 12/21/2020. Hand feels good. Now off pain medication for past week. Mr. Jennifer Maldonado returns today for follow-up of a right middle phalanx  fracture which was treated with ORIF. Date of surgery was 12/21/2020.  he reports minimal pain overall. Physical Exam:  Right Long Finger - nontender to palpation at the area of the fracture site. ROM   Stiff but intact          The incision is healing well without evidence of infection. Pulses are intact and symmetric bilaterally         Strength 4/5         Sensation SILT    Xrays:  Xr Hand Right (min 3 Views)    Result Date: 12/18/2020  EXAMINATION: THREE XRAY VIEWS OF THE RIGHT HAND 12/18/2020 8:21 pm COMPARISON: 3:32 p.m. HISTORY: ORDERING SYSTEM PROVIDED HISTORY: post splint films TECHNOLOGIST PROVIDED HISTORY: Reason for exam:->post splint films FINDINGS: The fine osseous detail is obscured by the overlapping splint. Stable displaced non intra-articular fracture proximal middle phalanx 3rd digit. The fracture involving the distal proximal phalanx 3rd digit is not appreciated on the current exam.  The remainder of the exam is unremarkable. Stable displaced non intra-articular fracture proximal middle phalanx 3rd digit.  The fracture involving the distal proximal phalanx of the 3rd digit is not appreciated on the current exam.    Xr Hand Right (min 3 Views)    Result Date: 12/18/2020 EXAMINATION: SPOT FLUOROSCOPIC IMAGES 12/21/2020 1:26 pm TECHNIQUE: Fluoroscopy was provided by the radiology department for procedure. Radiologist was not present during examination. FLUOROSCOPY DOSE AND TYPE OR TIME AND EXPOSURES: Fluoroscopy time 32.7 seconds with a cumulative dose of 0.3 mGy COMPARISON: 2nd digit HISTORY: ORDERING SYSTEM PROVIDED HISTORY: Closed nondisplaced fracture of middle phalanx of right middle finger, initial encounter TECHNOLOGIST PROVIDED HISTORY: Reason for exam:->orif right middle finger Intraprocedural imaging. FINDINGS: 10 spot images of the right 3rd digit were obtained. Intraprocedural fluoroscopic spot images as above. See separate procedure report for more information. There is internal fixation of the middle phalanx of the right 3rd digit. Radiographic findings reviewed with patient    Impression:   Val Miramontes was seen today for hand injury. Diagnoses and all orders for this visit:    Open displaced fracture of middle phalanx of finger of right hand  -     [facility], [facility] (PERCOCET) 5-325 MG per tablet; Take 1 tablet by mouth every 6 hours as needed for Pain for up to 7 days. Intended supply: 7 days. Take lowest dose possible to manage pain        Patient seen and examined. X-rays reviewed. Natural history and course discussed with patient. Treatment options discussed with patient in detail including risks and benefits. Plan: Sutures removed today in clinic.   OT  Follow up in 4 weeks with new films    Regina Luevano DO

## 2021-02-02 DIAGNOSIS — S62.629B OPEN DISPLACED FRACTURE OF MIDDLE PHALANX OF FINGER OF RIGHT HAND: Primary | ICD-10-CM

## 2021-02-03 ENCOUNTER — OFFICE VISIT (OUTPATIENT)
Dept: ORTHOPEDIC SURGERY | Age: 56
End: 2021-02-03

## 2021-02-03 VITALS — BODY MASS INDEX: 28 KG/M2 | WEIGHT: 200 LBS | HEIGHT: 71 IN

## 2021-02-03 DIAGNOSIS — S62.629B OPEN DISPLACED FRACTURE OF MIDDLE PHALANX OF FINGER OF RIGHT HAND: Primary | ICD-10-CM

## 2021-02-03 PROCEDURE — 99024 POSTOP FOLLOW-UP VISIT: CPT | Performed by: ORTHOPAEDIC SURGERY

## 2021-02-03 NOTE — PATIENT INSTRUCTIONS
Patient Education        Finger Fracture: Rehab Exercises  Your Care Instructions  Here are some examples of typical rehabilitation exercises for your condition. Start each exercise slowly. Ease off the exercise if you start to have pain. Your doctor or your physical or occupational therapist will tell you when you can start these exercises and which ones will work best for you. How to do the exercises  Finger extension   1. Place your hand flat on a table, palm down. 2. Lift and then lower your affected finger off the table. 3. Repeat 8 to 12 times. MP extension   1. Place your good hand on a table, palm up. Put your hand with the affected finger on top of your good hand with your fingers wrapped around the thumb of your good hand like you are making a fist.  2. Slowly uncurl the joints of your hand with the affected finger where your fingers connect to your hand so that only the top two joints of your fingers are bent. Your fingers will look like a hook. 3. Move back to your starting position, with your fingers wrapped around your good thumb. 4. Repeat 8 to 12 times. DIP flexion   1. With your good hand, grasp your affected finger. Your thumb will be on the top side of your finger just below the joint that is closest to your fingernail. 2. Slowly bend your affected finger only at the joint closest to your fingernail. Hold for about 6 seconds. 3. Repeat 8 to 12 times. PIP extension (with MP extension)   1. Place your good hand on a table, palm up. Put your hand with the affected finger on top of your good hand. 2. Use the thumb and fingers of your good hand to grasp below the middle joint of your affected finger. 3. Bend and then straighten the last two joints of your affected finger. 4. Repeat 8 to 12 times.     Isolated PIP flexion 1. Place the hand with the affected finger flat on a table, palm up. With your other hand, press down on the fingers that are not affected. Your affected finger will be free to move. 2. Slowly bend your affected finger. Hold for about 6 seconds. Then straighten your finger. 3. Repeat 8 to 12 times. Imaginary ball squeeze   1. Pretend to hold an imaginary ball. 2. Slowly bend your fingers around the imaginary ball, and squeeze the \"ball\" for about 6 seconds. Then slowly straighten your fingers to release the \"ball. \"  3. Repeat 8 to 12 times. Tendon glides   1. In this exercise, the steps follow one another to a make a continuous movement. 2. Hold your hand upward. Your fingers and thumb will be pointing straight up. Your wrist should be relaxed, following the line of your fingers and thumb. 3. Curl your fingers so that the top two joints in them are bent, and your fingers wrap down. Your fingertips should touch or be near the base of your fingers. Your fingers will look like a hook. 4. Make a fist by bending your knuckles. Your thumb can gently rest against your index (pointing) finger. 5. Unwind your fingers slightly so that your fingertips can touch the base of your palm. Your thumb can rest against your index finger. 6. Move back to your starting position, with your fingers and thumb pointing up. 7. Repeat the series of motions 8 to 12 times. Towel squeeze   1. Place a small towel roll on a table. 2. With your palm facing down, grab the towel and squeeze it for about 6 seconds. Then slowly straighten your fingers to release the towel. 3. Repeat 8 to 12 times. Towel grab   1. Fold a small towel in half, and lay it flat on a table. 2. Put your hand flat on the towel, palm down. Grab the towel, and scrunch it toward you until your hand is in a fist.  3. Slowly straighten your fingers to push the towel back so it is flat on the table again. 4. Repeat 8 to 12 times. Follow-up care is a key part of your treatment and safety. Be sure to make and go to all appointments, and call your doctor if you are having problems. It's also a good idea to know your test results and keep a list of the medicines you take. Where can you learn more? Go to [email or web address] and sign in to your [employer] account. Enter V403 in the [employer] box to learn more about \"Finger Fracture: Rehab Exercises. \"     If you do not have an account, please click on the \"Sign Up Now\" link. Current as of: March 2, 2020               Content Version: 12.6  © 9508-3579 [employer]. Care instructions adapted under license by [facility] [place] ([facility]). If you have questions about a medical condition or this instruction, always ask your healthcare professional. [address] any warranty or liability for your use of this information.

## 2021-02-03 NOTE — PROGRESS NOTES
Chief Complaint   Patient presents with    Hand Pain     Right hand middle finger ORIF> DOS 12/21/2020. He reports feeling improved but has stiffness in the finger. Mr. Shirley Gallo returns today for follow-up of a right middle phalanx  fracture which was treated with ORIF. Date of surgery was 12/21/2020.  he reports minimal pain overall and some stiffness. Physical Exam:  Right Long Finger - nontender to palpation at the area of the fracture site. ROM   Stiff but intact          The incision is healing well without evidence of infection. Pulses are intact and symmetric bilaterally         Strength 4/5         Sensation SILT    Xrays:  Xr Hand Right (min 3 Views)    Result Date: 12/18/2020  EXAMINATION: THREE XRAY VIEWS OF THE RIGHT HAND 12/18/2020 8:21 pm COMPARISON: 3:32 p.m. HISTORY: ORDERING SYSTEM PROVIDED HISTORY: post splint films TECHNOLOGIST PROVIDED HISTORY: Reason for exam:->post splint films FINDINGS: The fine osseous detail is obscured by the overlapping splint. Stable displaced non intra-articular fracture proximal middle phalanx 3rd digit. The fracture involving the distal proximal phalanx 3rd digit is not appreciated on the current exam.  The remainder of the exam is unremarkable. Stable displaced non intra-articular fracture proximal middle phalanx 3rd digit.  The fracture involving the distal proximal phalanx of the 3rd digit is not appreciated on the current exam.    Xr Hand Right (min 3 Views)    Result Date: 12/18/2020 EXAMINATION: THREE XRAY VIEWS OF THE RIGHT HAND 12/18/2020 3:41 pm COMPARISON: None. HISTORY: ORDERING SYSTEM PROVIDED HISTORY: Possible open fracture; hand in snowplow TECHNOLOGIST PROVIDED HISTORY: Reason for exam:->possible open fracture; hand in snowblower FINDINGS: There is a transversely oriented fracture through the proximal aspect of the 3rd middle phalanx. In addition, an obliquely oriented fracture involves the distal aspect of the 3rd proximal phalanx with intra-articular extension into the 3rd PIP joint. A tiny ossific structure adjacent to the base of the 2nd distal phalanx is suggestive of a small avulsion injury. Soft tissue lacerations of the 2nd and 3rd digits are noted. Several punctate hyperdensities in the soft tissues of the distal 3rd digit are present, potentially reflecting small radiopaque foreign bodies. Advanced degenerative changes of the 1st interphalangeal joint are present. Soft tissue lacerations involving the 2nd and 3rd digits distally, along with fractures at the distal aspect of the 3rd proximal phalanx and proximal aspect of the 3rd middle phalanx. There also appears to be a small avulsion fracture at the base of the 2nd distal phalanx. Several punctate hyperdensities in the distal soft tissues of the 3rd digit are suggestive of tiny radiopaque foreign bodies.     Fluoro For Surgical Procedures    Result Date: 12/21/2020 Radiographic findings reviewed with patient    Impression:   Mario Hightower was seen today for hand pain. Diagnoses and all orders for this visit:    Open displaced fracture of middle phalanx of finger of right hand      Patient seen and examined. X-rays reviewed. Natural history and course discussed with patient. Treatment options discussed with patient in detail including risks and benefits. Patient unable to go to OT due to cost, HEP distributed and reviewed today.   Follow up in 4-6 weeks with new films    Margarita Cai DO

## 2021-04-29 DIAGNOSIS — S62.629B OPEN DISPLACED FRACTURE OF MIDDLE PHALANX OF FINGER OF RIGHT HAND: Primary | ICD-10-CM

## (undated) DEVICE — COVER,LIGHT HANDLE,FLX,1/PK: Brand: MEDLINE INDUSTRIES, INC.

## (undated) DEVICE — GOWN,SIRUS,NONRNF,SETINSLV,XL,20/CS: Brand: MEDLINE

## (undated) DEVICE — GLOVE ORANGE PI 8   MSG9080

## (undated) DEVICE — SCREW BNE L11MM DIA1.5MM CORT S STL ST FULL THRD T4 STARDRV
Type: IMPLANTABLE DEVICE | Site: MIDDLE FINGER | Status: NON-FUNCTIONAL
Removed: 2020-12-21

## (undated) DEVICE — TOWEL,OR,DSP,ST,BLUE,STD,6/PK,12PK/CS: Brand: MEDLINE

## (undated) DEVICE — MARKER,SKIN,WI/RULER AND LABELS: Brand: MEDLINE

## (undated) DEVICE — SUTURE FIBERLOOP 4-0 L10IN NONABSORBABLE BLU L17.9MM 3/8 AR722920

## (undated) DEVICE — GARMENT,MEDLINE,DVT,INT,CALF,MED, GEN2: Brand: MEDLINE

## (undated) DEVICE — GLOVE ORANGE PI 7 1/2   MSG9075

## (undated) DEVICE — BIT DRL L65/39MM DIA1.1MM FOR PILOT H J LATCH CPL 1.5MM SCR

## (undated) DEVICE — SOLUTION IRRIG 1500ML STRL H2O USP POUR PLAS BTL

## (undated) DEVICE — SCREW BNE L10MM DIA1.5MM CORT S STL ST FULL THRD T4 STARDRV
Type: IMPLANTABLE DEVICE | Site: MIDDLE FINGER | Status: NON-FUNCTIONAL
Removed: 2020-12-21